# Patient Record
Sex: FEMALE | Race: BLACK OR AFRICAN AMERICAN | NOT HISPANIC OR LATINO | Employment: UNEMPLOYED | URBAN - METROPOLITAN AREA
[De-identification: names, ages, dates, MRNs, and addresses within clinical notes are randomized per-mention and may not be internally consistent; named-entity substitution may affect disease eponyms.]

---

## 2022-05-24 ENCOUNTER — NURSE TRIAGE (OUTPATIENT)
Dept: ADMINISTRATIVE | Facility: CLINIC | Age: 22
End: 2022-05-24
Payer: MEDICAID

## 2022-05-24 NOTE — TELEPHONE ENCOUNTER
"Last night ate [employer]-concerned for food poisoning from that. 16 weeks pregnant. This afternoon woke up with stomach pain, and had diarrhea. Then started vomiting. 4 episodes of diarrhea so far and only the one episode of vomiting so far. Feeling a little weak and dizzy but walking well without support. Urinated one hour ago. Still tolerating fluids. Gave home care advice as documented. Please contact caller with any further care advice.     Reason for Disposition   MILD vomiting with diarrhea (all triage questions negative)    Additional Information   Negative: Shock suspected (e.g., cold/pale/clammy skin, too weak to stand, low BP, rapid pulse)   Negative: Difficult to awaken or acting confused  (e.g., disoriented, slurred speech)   Negative: Sounds like a life-threatening emergency to the triager   Negative: Vomiting occurs only while coughing   Negative: [1] Pregnant < 20 Weeks AND [2] nausea/vomiting began in early pregnancy (i.e., 4-8 weeks pregnant)   Negative: Chest pain   Negative: [1] SEVERE headache AND [2] similar to prior migraines   Negative: [1] Vomiting AND [2] contains red blood or black ("coffee ground") material  (Exception: few red streaks in vomit that only happened once)   Negative: Severe pain in one eye   Negative: Recent head injury (within last 3 days)   Negative: Recent abdominal injury (within last 3 days)   Negative: [1] Insulin-dependent diabetes (Type I) AND [2] glucose > 400 mg/dl (22 mmol/l)   Negative: [1] SEVERE vomiting (e.g., 6 or more times/day) AND [2] present > 8 hours   Negative: [1] MODERATE vomiting (e.g., 3 - 5 times/day) AND [2] age > 60   Negative: Severe headache (e.g., excruciating) (Exception: similar to previous migraines)   Negative: High-risk adult (e.g., diabetes mellitus, brain tumor, V-P shunt, hernia)   Negative: [1] Drinking very little AND [2] dehydration suspected (e.g., no urine > 12 hours, very dry mouth, very lightheaded)   " Negative: Patient sounds very sick or weak to the triager   Negative: [1] MILD to MODERATE vomiting (e.g., 1-5 times/day) AND [2] abdomen looks much more swollen than usual   Negative: [1] Vomiting AND [2] contains bile (green color)   Negative: [1] Constant abdominal pain AND [2] present > 2 hours   Negative: [1] Fever > 103 F (39.4 C) AND [2] not able to get the fever down using Fever Care Advice   Negative: [1] Fever > 101 F (38.3 C) AND [2] age > 60   Negative: [1] Fever > 101 F (38.3 C) AND [2] bedridden (e.g., nursing home patient, CVA, chronic illness, recovering from surgery)   Negative: [1] Fever > 100.5 F (38.1 C) AND [2] weak immune system (e.g., HIV positive, cancer chemo, splenectomy, organ transplant, chronic steroids)   Negative: Taking any of the following medications: digoxin (Lanoxin), lithium, theophylline, phenytoin (Dilantin)   Negative: [1] MILD or MODERATE vomiting AND [2] present > 48 hours (2 days) (Exception: mild vomiting with associated diarrhea)   Negative: Fever present > 3 days (72 hours)   Negative: Vomiting a prescription medication   Negative: [1] MILD vomiting with diarrhea AND [2] present > 5 days   Negative: Alcohol or drug abuse, known or suspected   Negative: Vomiting is a chronic symptom (recurrent or ongoing AND present > 4 weeks)   Negative: [1] SEVERE vomiting (e.g., 6 or more times/day, vomits everything) BUT [2] hydrated (all triage questions negative)   Negative: MILD or MODERATE vomiting (e.g., 1 - 5 times / day) (all triage questions negative)    Protocols used: ST VOMITING-A-

## 2022-05-25 ENCOUNTER — NURSE TRIAGE (OUTPATIENT)
Dept: ADMINISTRATIVE | Facility: CLINIC | Age: 22
End: 2022-05-25
Payer: MEDICAID

## 2022-05-25 ENCOUNTER — HOSPITAL ENCOUNTER (EMERGENCY)
Facility: HOSPITAL | Age: 22
Discharge: HOME | End: 2022-05-25
Attending: EMERGENCY MEDICINE
Payer: MEDICAID

## 2022-05-25 VITALS
HEART RATE: 89 BPM | DIASTOLIC BLOOD PRESSURE: 70 MMHG | WEIGHT: 147 LBS | BODY MASS INDEX: 27.75 KG/M2 | HEIGHT: 61 IN | RESPIRATION RATE: 18 BRPM | TEMPERATURE: 98 F | OXYGEN SATURATION: 99 % | SYSTOLIC BLOOD PRESSURE: 118 MMHG

## 2022-05-25 DIAGNOSIS — K52.9 GASTROENTERITIS: Primary | ICD-10-CM

## 2022-05-25 LAB
B-HCG UR QL: POSITIVE
BUN SERPL-MCNC: 6 MG/DL (ref 6–30)
CHLORIDE SERPL-SCNC: 102 MMOL/L (ref 95–110)
CREAT SERPL-MCNC: 0.5 MG/DL (ref 0.5–1.4)
CTP QC/QA: YES
GLUCOSE SERPL-MCNC: 74 MG/DL (ref 70–110)
HCT VFR BLD CALC: 33 %PCV (ref 36–54)
POC IONIZED CALCIUM: 1.24 MMOL/L (ref 1.06–1.42)
POC TCO2 (MEASURED): 23 MMOL/L (ref 23–29)
POTASSIUM BLD-SCNC: 3.8 MMOL/L (ref 3.5–5.1)
SAMPLE: ABNORMAL
SODIUM BLD-SCNC: 135 MMOL/L (ref 136–145)

## 2022-05-25 PROCEDURE — 99283 EMERGENCY DEPT VISIT LOW MDM: CPT

## 2022-05-25 PROCEDURE — 99284 PR EMERGENCY DEPT VISIT,LEVEL IV: ICD-10-PCS | Mod: ,,, | Performed by: EMERGENCY MEDICINE

## 2022-05-25 PROCEDURE — 99284 EMERGENCY DEPT VISIT MOD MDM: CPT

## 2022-05-25 PROCEDURE — 80047 BASIC METABLC PNL IONIZED CA: CPT

## 2022-05-25 PROCEDURE — 63600175 PHARM REV CODE 636 W HCPCS: Performed by: EMERGENCY MEDICINE

## 2022-05-25 PROCEDURE — 81025 URINE PREGNANCY TEST: CPT | Performed by: EMERGENCY MEDICINE

## 2022-05-25 PROCEDURE — 99284 EMERGENCY DEPT VISIT MOD MDM: CPT | Mod: ,,, | Performed by: EMERGENCY MEDICINE

## 2022-05-25 RX ORDER — VITAMIN A, ASCORBIC ACID, VITAMIN D, .ALPHA.-TOCOPHEROL, THIAMINE MONONITRATE, RIBOFLAVIN, NIACIN, PYRIDOXINE HYDROCHLORIDE, FOLIC ACID, CYANOCOBALAMIN, CALCIUM, IRON, MAGNESIUM, ZINC, COPPER, AND DOCONEXENT 65-1-250MG
KIT ORAL DAILY
COMMUNITY
Start: 2022-05-04

## 2022-05-25 RX ORDER — ONDANSETRON 2 MG/ML
4 INJECTION INTRAMUSCULAR; INTRAVENOUS
Status: DISCONTINUED | OUTPATIENT
Start: 2022-05-25 | End: 2022-05-25 | Stop reason: HOSPADM

## 2022-05-25 RX ADMIN — SODIUM CHLORIDE, SODIUM LACTATE, POTASSIUM CHLORIDE, AND CALCIUM CHLORIDE 1000 ML: .6; .31; .03; .02 INJECTION, SOLUTION INTRAVENOUS at 06:05

## 2022-05-25 NOTE — TELEPHONE ENCOUNTER
Patient is 16/6 weeks pregnant, called yesterday to OOC and is now calling back because symptoms persist and have worsened.  She is now having diarrhea and this am she vomited and the emesis was green in color.  I advised that she be seen in the ED, with another adult driving.  She verbalized understanding and will have her fiancee drive her to the ED.    Reason for Disposition   Vomiting contains bile (green color)    Additional Information   Negative: Shock suspected (e.g., cold/pale/clammy skin, too weak to stand)   Negative: Difficult to awaken or acting confused (e.g., disoriented, slurred speech)   Negative: Sounds like a life-threatening emergency to the triager   Negative: SEVERE vomiting (e.g., 6 or more times/day)     twice   Negative: MODERATE vomiting (e.g., 3 - 5 times/day) and age > 60    Protocols used: ST VOMITING-A-[place]

## 2022-05-25 NOTE — DISCHARGE INSTRUCTIONS
Your electrolytes are within normal limits.  Please continue to hydrate.  Follow-up with your OB at your scheduled appointment.  Return to ED for worsening symptoms.

## 2022-05-25 NOTE — ED PROVIDER NOTES
Encounter Date: 2022    SCRIBE #1 NOTE: I, Johana Rucker, am scribing for, and in the presence of,  Crissy Jordan MD. I have scribed the entire note.       History     Chief Complaint   Patient presents with    Diarrhea     Diarrhea since about noon yesterday. 16 weeks 6 days pregnant. Vomited green substance this morning. No vaginal discharge or bleeding. Feeling normal baby movement.     Time patient was seen by the provider: 5:17 PM      The patient is a 21 y.o. female with no significant PMHx who presents to the ED with a complaint of diarrhea with onset yesterday. She went to [employer] 2 nights ago and felt fine that night. Yesterday she woke up around noon with abdominal pain. She went to the bathroom with diarrhea and had yellow vomit. She is 16 weeks pregnant . She has had nausea and vomiting throughout her pregnancy. She has been taking prenatals and has had no problems with this pregnancy. She has also been feeling weak and dizzy which is better today than yesterday. This morning, she had another episode of diarrhea and vomiting but the vomit was green. She has been able to drink water and ate some [employer]ipot[employer] yesterday which she was able to keep down but did not eat much of it. She is no longer having abdominal pain and it always occurred right before having diarrhea and vomiting. She denies any fever, chills, dysuria, hematuria, chest pain, SOB, sore throat, vaginal bleeding, or vaginal discharge.     The history is provided by the patient and medical records. No  was used.     Review of patient's allergies indicates:  No Known Allergies  History reviewed. No pertinent past medical history.  No past surgical history on file.  History reviewed. No pertinent family history.  Social History     Tobacco Use    Smoking status: Never Smoker    Smokeless tobacco: Never Used   Substance Use Topics    Alcohol use: Not Currently    Drug use: Never     Review of Systems    Constitutional: Positive for appetite change (decreased). Negative for chills and fever.        + General weakness.   HENT: Negative for sore throat.    Respiratory: Negative for shortness of breath.    Cardiovascular: Negative for chest pain.   Gastrointestinal: Positive for abdominal pain (lower), diarrhea, nausea and vomiting.   Genitourinary: Negative for dysuria, hematuria, vaginal bleeding and vaginal discharge.   Musculoskeletal: Negative for back pain.   Skin: Negative for rash.   Neurological: Positive for dizziness. Negative for weakness.   Hematological: Does not bruise/bleed easily.       Physical Exam     Initial Vitals [05/25/22 1710]   BP Pulse Resp Temp SpO2   118/70 89 18 97.9 °F (36.6 °C) 99 %      MAP       --         Physical Exam    Constitutional: She appears well-developed and well-nourished. She is not diaphoretic. No distress.   HENT:   Head: Normocephalic and atraumatic.   Dry mucous membranes. No cervical lymphadenopathy.    Eyes: Conjunctivae and EOM are normal. Pupils are equal, round, and reactive to light. Right eye exhibits no discharge. Left eye exhibits no discharge. No scleral icterus.   Neck: Neck supple.   Normal range of motion.  Cardiovascular: Normal rate and regular rhythm. Exam reveals no friction rub.    No murmur heard.  Pulmonary/Chest: Breath sounds normal. No respiratory distress. She has no wheezes. She has no rales.   Abdominal: Abdomen is soft. Bowel sounds are normal. She exhibits no distension. There is abdominal tenderness (slight) in the left lower quadrant.   No RUQ or epigastric tenderness. There is no rebound and no guarding.   Genitourinary:    Genitourinary Comments: Gravid uterus below the level of the umbilicus.      Musculoskeletal:         General: Normal range of motion.      Cervical back: Normal range of motion and neck supple.     Neurological: She is alert and oriented to person, place, and time. No cranial nerve deficit or sensory deficit. GCS  score is 15. GCS eye subscore is 4. GCS verbal subscore is 5. GCS motor subscore is 6.   Skin: Skin is warm and dry. No erythema. No pallor.   Psychiatric: She has a normal mood and affect. Her behavior is normal. Judgment and thought content normal.         ED Course   Procedures  Labs Reviewed   POCT URINE PREGNANCY - Abnormal; Notable for the following components:       Result Value    POC Preg Test, Ur Positive (*)     All other components within normal limits   ISTAT PROCEDURE - Abnormal; Notable for the following components:    POC Sodium 135 (*)     POC Hematocrit 33 (*)     All other components within normal limits   URINALYSIS, REFLEX TO URINE CULTURE          Imaging Results    None          Medications   lactated ringers bolus 1,000 mL (0 mLs Intravenous Stopped 5/25/22 1835)     Medical Decision Making:   History:   Old Medical Records: I decided to obtain old medical records.  Initial Assessment:   Emergent evaluation of a 21 year old female here with nausea, vomiting, and diarrhea who is 16 weeks pregnant. She has no fever or chills and her vital signs are stable on arrival. Patient is well appearing in no distress. She appears slightly dehydrated but her abdomen is soft.   Differential Diagnosis:   Dehydration, acute kidney injury, electrolyte derangement, gastroenteritis.   Clinical Tests:   Lab Tests: Ordered and Reviewed  ED Management:  - ISTAT  - Bedside ultrasound performed with good fetal movement. Fetal heart rate: 150.  - IV fluids and Zofran.          Scribe Attestation:   Scribe #1: I performed the above scribed service and the documentation accurately describes the services I performed. I attest to the accuracy of the note.    Attending Attestation:           Physician Attestation for Scribe:  Physician Attestation Statement for Scribe #1: I, DAYANNA Jordan MD, reviewed documentation, as scribed by in my presence, and it is both accurate and complete.             ED Course as of 05/25/22  2236   Wed May 25, 2022   1807 POC Sodium(!): 135 [GM]   1807 POC Potassium: 3.8 [GM]   1807 POC Creatinine: 0.5 [GM]   1807 POC BUN: 6 [GM]      ED Course User Index  [GM] Crissy Jordan MD          labs reassuring.  Patient discharged in stable condition pending follow-up with OB at scheduled appointment.  Return precautions given.    Clinical Impression:   Final diagnoses:  [K52.9] Gastroenteritis (Primary)          ED Disposition Condition    Discharge Stable        ED Prescriptions     None        Follow-up Information     Follow up With Specialties Details Why Contact Info        Return to emergency department if symptoms get worse otherwise follow-up with your OB as scheduled           Crissy Jordan MD  05/25/22 2235